# Patient Record
Sex: FEMALE | Race: WHITE | Employment: UNEMPLOYED | URBAN - METROPOLITAN AREA
[De-identification: names, ages, dates, MRNs, and addresses within clinical notes are randomized per-mention and may not be internally consistent; named-entity substitution may affect disease eponyms.]

---

## 2019-09-19 ENCOUNTER — HOSPITAL ENCOUNTER (EMERGENCY)
Age: 34
Discharge: LEFT BEFORE DISCHARGE | End: 2019-09-19
Attending: EMERGENCY MEDICINE

## 2019-09-19 VITALS
OXYGEN SATURATION: 93 % | DIASTOLIC BLOOD PRESSURE: 62 MMHG | SYSTOLIC BLOOD PRESSURE: 95 MMHG | RESPIRATION RATE: 14 BRPM | BODY MASS INDEX: 24.8 KG/M2 | WEIGHT: 140 LBS | HEIGHT: 63 IN | HEART RATE: 81 BPM | TEMPERATURE: 98.6 F

## 2019-09-19 DIAGNOSIS — F19.10 POLYSUBSTANCE ABUSE (HCC): Primary | ICD-10-CM

## 2019-09-19 PROCEDURE — 99284 EMERGENCY DEPT VISIT MOD MDM: CPT

## 2019-09-19 NOTE — ED NOTES
Bed: ED-29  Expected date:   Expected time:   Means of arrival:   Comments:  [id number]     Sven Peterson RN  09/19/19 [id number]
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
## 2019-09-20 NOTE — ED PROVIDER NOTES
Patient was signed out to me by Dr. Rupert Torres. Patient eloped prior to my ability to independently evaluate the patient.         Mahsa Jeter MD  09/20/19 [id number]

## 2020-02-07 ENCOUNTER — HOSPITAL ENCOUNTER (EMERGENCY)
Age: 35
Discharge: HOME | End: 2020-02-08
Attending: EMERGENCY MEDICINE
Payer: COMMERCIAL

## 2020-02-07 VITALS
HEIGHT: 63 IN | OXYGEN SATURATION: 97 % | WEIGHT: 139.99 LBS | DIASTOLIC BLOOD PRESSURE: 56 MMHG | HEART RATE: 103 BPM | TEMPERATURE: 97.7 F | RESPIRATION RATE: 17 BRPM | SYSTOLIC BLOOD PRESSURE: 98 MMHG | BODY MASS INDEX: 24.8 KG/M2

## 2020-02-07 LAB
AMPHETAMINES: ABNORMAL
BACTERIA: NEGATIVE /HPF
BARBITURATE SCREEN URINE: NEGATIVE
BENZODIAZEPINE SCREEN, URINE: NEGATIVE
BILIRUBIN URINE: NEGATIVE MG/DL
BLOOD, URINE: NEGATIVE
CANNABINOID SCREEN URINE: ABNORMAL
CLARITY: ABNORMAL
COCAINE METABOLITE: ABNORMAL
COLOR: YELLOW
GLUCOSE, URINE: NEGATIVE MG/DL
INTERPRETATION: NORMAL
KETONES, URINE: NEGATIVE MG/DL
LEUKOCYTE ESTERASE, URINE: ABNORMAL
MUCUS: ABNORMAL HPF
NITRITE URINE, QUANTITATIVE: NEGATIVE
OPIATES, URINE: ABNORMAL
OXYCODONE: ABNORMAL
PH, URINE: 5 (ref 5–8)
PHENCYCLIDINE, URINE: NEGATIVE
PREGNANCY, URINE: NEGATIVE
PROTEIN UA: NEGATIVE MG/DL
RBC URINE: 2 /HPF (ref 0–6)
SPECIFIC GRAVITY UA: 1.02 (ref 1–1.03)
SPECIFIC GRAVITY, URINE: 1.02 (ref 1–1.03)
SQUAMOUS EPITHELIAL: 11 /HPF
TRICHOMONAS: ABNORMAL /HPF
UROBILINOGEN, URINE: NORMAL MG/DL (ref 0.2–1)
WBC UA: 3 /HPF (ref 0–5)

## 2020-02-07 PROCEDURE — 99285 EMERGENCY DEPT VISIT HI MDM: CPT

## 2020-02-07 PROCEDURE — 80307 DRUG TEST PRSMV CHEM ANLYZR: CPT

## 2020-02-07 PROCEDURE — 2720000011 HC SANE KIT SUPPLY STERILE

## 2020-02-07 PROCEDURE — 81025 URINE PREGNANCY TEST: CPT

## 2020-02-07 PROCEDURE — 81001 URINALYSIS AUTO W/SCOPE: CPT

## 2020-02-08 PROCEDURE — 6360000002 HC RX W HCPCS: Performed by: EMERGENCY MEDICINE

## 2020-02-08 PROCEDURE — 6370000000 HC RX 637 (ALT 250 FOR IP): Performed by: EMERGENCY MEDICINE

## 2020-02-08 PROCEDURE — 96372 THER/PROPH/DIAG INJ SC/IM: CPT

## 2020-02-08 RX ORDER — METRONIDAZOLE 250 MG/1
1000 TABLET ORAL ONCE
Status: COMPLETED | OUTPATIENT
Start: 2020-02-08 | End: 2020-02-08

## 2020-02-08 RX ORDER — CEFTRIAXONE SODIUM 250 MG/1
250 INJECTION, POWDER, FOR SOLUTION INTRAMUSCULAR; INTRAVENOUS ONCE
Status: COMPLETED | OUTPATIENT
Start: 2020-02-08 | End: 2020-02-08

## 2020-02-08 RX ORDER — AZITHROMYCIN 250 MG/1
1000 TABLET, FILM COATED ORAL ONCE
Status: COMPLETED | OUTPATIENT
Start: 2020-02-08 | End: 2020-02-08

## 2020-02-08 RX ADMIN — METRONIDAZOLE 1000 MG: 250 TABLET ORAL at 00:37

## 2020-02-08 RX ADMIN — AZITHROMYCIN MONOHYDRATE 1000 MG: 250 TABLET ORAL at 00:37

## 2020-02-08 RX ADMIN — CEFTRIAXONE SODIUM 250 MG: 250 INJECTION, POWDER, FOR SOLUTION INTRAMUSCULAR; INTRAVENOUS at 00:36

## 2020-02-08 NOTE — ED NOTES
[facility] nurse notified.    [employer] notified      Yobani Mosher [facility]  02/07/20 1295
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
## 2020-03-26 ENCOUNTER — HOSPITAL ENCOUNTER (EMERGENCY)
Age: 35
Discharge: HOME | End: 2020-03-26
Attending: EMERGENCY MEDICINE

## 2020-03-26 VITALS
SYSTOLIC BLOOD PRESSURE: 137 MMHG | RESPIRATION RATE: 16 BRPM | DIASTOLIC BLOOD PRESSURE: 87 MMHG | OXYGEN SATURATION: 95 % | BODY MASS INDEX: 24.8 KG/M2 | WEIGHT: 140 LBS | HEART RATE: 111 BPM | HEIGHT: 63 IN | TEMPERATURE: 98.6 F

## 2020-03-26 PROCEDURE — 6370000000 HC RX 637 (ALT 250 FOR IP): Performed by: EMERGENCY MEDICINE

## 2020-03-26 PROCEDURE — 99283 EMERGENCY DEPT VISIT LOW MDM: CPT

## 2020-03-26 RX ORDER — DIAPER,BRIEF,INFANT-TODD,DISP
EACH MISCELLANEOUS ONCE
Status: COMPLETED | OUTPATIENT
Start: 2020-03-26 | End: 2020-03-26

## 2020-03-26 RX ADMIN — BACITRACIN ZINC: 500 OINTMENT TOPICAL at 19:20

## 2020-03-26 ASSESSMENT — ENCOUNTER SYMPTOMS
COUGH: 0
SORE THROAT: 0
ABDOMINAL PAIN: 0
NAUSEA: 0
EYE PAIN: 0
VOMITING: 0
EYE DISCHARGE: 0
RHINORRHEA: 0
BACK PAIN: 0
SHORTNESS OF BREATH: 0

## 2020-03-26 ASSESSMENT — PAIN DESCRIPTION - ORIENTATION: ORIENTATION: RIGHT

## 2020-03-26 ASSESSMENT — PAIN DESCRIPTION - PAIN TYPE: TYPE: ACUTE PAIN

## 2020-03-26 ASSESSMENT — PAIN SCALES - GENERAL: PAINLEVEL_OUTOF10: 5

## 2020-03-26 ASSESSMENT — PAIN DESCRIPTION - LOCATION: LOCATION: ANKLE

## 2020-05-21 ENCOUNTER — HOSPITAL ENCOUNTER (OUTPATIENT)
Age: 35
Setting detail: SPECIMEN
Discharge: HOME | End: 2020-05-21
Payer: COMMERCIAL

## 2020-05-21 PROCEDURE — U0002 COVID-19 LAB TEST NON-CDC: HCPCS

## 2020-05-23 LAB
SARS-COV-2: NOT DETECTED
SOURCE: NORMAL

## 2021-07-15 ENCOUNTER — HOSPITAL ENCOUNTER (OUTPATIENT)
Age: 36
Discharge: HOME | End: 2021-07-15
Payer: COMMERCIAL

## 2021-07-15 LAB
ALBUMIN SERPL-MCNC: 4.9 GM/DL (ref 3.4–5)
ALP BLD-CCNC: 66 IU/L (ref 40–129)
ALT SERPL-CCNC: 12 U/L (ref 10–40)
AST SERPL-CCNC: 16 IU/L (ref 15–37)
BILIRUB SERPL-MCNC: 0.7 MG/DL (ref 0–1)
BILIRUBIN DIRECT: 0.2 MG/DL (ref 0–0.3)
BILIRUBIN, INDIRECT: 0.5 MG/DL (ref 0–0.7)
HEPATITIS B SURFACE ANTIGEN: NON REACTIVE
HEPATITIS C ANTIBODY: ABNORMAL
TOTAL PROTEIN: 7.8 GM/DL (ref 6.4–8.2)

## 2021-07-15 PROCEDURE — 87340 HEPATITIS B SURFACE AG IA: CPT

## 2021-07-15 PROCEDURE — 36415 COLL VENOUS BLD VENIPUNCTURE: CPT

## 2021-07-15 PROCEDURE — 80076 HEPATIC FUNCTION PANEL: CPT

## 2021-07-15 PROCEDURE — 86708 HEPATITIS A ANTIBODY: CPT

## 2021-07-15 PROCEDURE — 86803 HEPATITIS C AB TEST: CPT

## 2021-07-15 PROCEDURE — 87389 HIV-1 AG W/HIV-1&-2 AB AG IA: CPT

## 2021-07-16 LAB — HIV SCREEN: NON REACTIVE

## 2021-07-17 LAB — HAV AB SERPL IA-ACNC: POSITIVE

## 2021-08-09 ENCOUNTER — HOSPITAL ENCOUNTER (OUTPATIENT)
Age: 36
Setting detail: SPECIMEN
Discharge: HOME | End: 2021-08-09
Payer: COMMERCIAL

## 2021-08-09 LAB
SARS-COV-2: NOT DETECTED
SOURCE: NORMAL

## 2021-08-09 PROCEDURE — U0003 INFECTIOUS AGENT DETECTION BY NUCLEIC ACID (DNA OR RNA); SEVERE ACUTE RESPIRATORY SYNDROME CORONAVIRUS 2 (SARS-COV-2) (CORONAVIRUS DISEASE [COVID-19]), AMPLIFIED PROBE TECHNIQUE, MAKING USE OF HIGH THROUGHPUT TECHNOLOGIES AS DESCRIBED BY CMS-2020-01-R: HCPCS

## 2021-08-09 PROCEDURE — U0005 INFEC AGEN DETEC AMPLI PROBE: HCPCS
